# Patient Record
Sex: FEMALE | URBAN - METROPOLITAN AREA
[De-identification: names, ages, dates, MRNs, and addresses within clinical notes are randomized per-mention and may not be internally consistent; named-entity substitution may affect disease eponyms.]

---

## 2020-07-06 ENCOUNTER — OFFICE VISIT (OUTPATIENT)
Dept: INTERNAL MEDICINE CLINIC | Age: 70
End: 2020-07-06

## 2020-07-06 VITALS
HEIGHT: 62 IN | OXYGEN SATURATION: 98 % | TEMPERATURE: 98.6 F | BODY MASS INDEX: 18.22 KG/M2 | DIASTOLIC BLOOD PRESSURE: 70 MMHG | WEIGHT: 99 LBS | SYSTOLIC BLOOD PRESSURE: 138 MMHG | RESPIRATION RATE: 16 BRPM | HEART RATE: 72 BPM

## 2020-07-06 DIAGNOSIS — R63.4 WEIGHT LOSS: ICD-10-CM

## 2020-07-06 DIAGNOSIS — R45.7 CAREGIVER STRESS SYNDROME: Primary | ICD-10-CM

## 2020-07-06 DIAGNOSIS — F41.8 INSOMNIA SECONDARY TO DEPRESSION WITH ANXIETY: ICD-10-CM

## 2020-07-06 DIAGNOSIS — F32.A MILD DEPRESSION: ICD-10-CM

## 2020-07-06 DIAGNOSIS — R53.82 CHRONIC FATIGUE: ICD-10-CM

## 2020-07-06 DIAGNOSIS — M05.731 RHEUMATOID ARTHRITIS INVOLVING BOTH WRISTS WITH POSITIVE RHEUMATOID FACTOR (HCC): ICD-10-CM

## 2020-07-06 DIAGNOSIS — F51.05 INSOMNIA SECONDARY TO DEPRESSION WITH ANXIETY: ICD-10-CM

## 2020-07-06 DIAGNOSIS — M05.732 RHEUMATOID ARTHRITIS INVOLVING BOTH WRISTS WITH POSITIVE RHEUMATOID FACTOR (HCC): ICD-10-CM

## 2020-07-06 LAB
A-G RATIO,AGRAT: 1.6 RATIO
ALBUMIN SERPL-MCNC: 4.5 G/DL (ref 3.9–5.4)
ALP SERPL-CCNC: 119 U/L (ref 38–126)
ALT SERPL-CCNC: 14 U/L (ref 0–35)
ANION GAP SERPL CALC-SCNC: 5 MMOL/L
AST SERPL W P-5'-P-CCNC: 19 U/L (ref 14–36)
BILIRUB SERPL-MCNC: 0.6 MG/DL (ref 0.2–1.3)
BUN SERPL-MCNC: 17 MG/DL (ref 7–17)
BUN/CREATININE RATIO,BUCR: 24 RATIO
CALCIUM SERPL-MCNC: 10 MG/DL (ref 8.4–10.2)
CHLORIDE SERPL-SCNC: 104 MMOL/L (ref 98–107)
CO2 SERPL-SCNC: 29 MMOL/L (ref 22–32)
CREAT SERPL-MCNC: 0.7 MG/DL (ref 0.7–1.2)
ERYTHROCYTE [DISTWIDTH] IN BLOOD BY AUTOMATED COUNT: 13.2 %
GLOBULIN,GLOB: 2.8
GLUCOSE SERPL-MCNC: 95 MG/DL (ref 65–105)
HCT VFR BLD AUTO: 42.5 % (ref 37–51)
HGB BLD-MCNC: 13.7 G/DL (ref 12–18)
LYMPHOCYTES ABSOLUTE: 1.4 K/UL (ref 0.6–4.1)
LYMPHOCYTES NFR BLD: 22.6 % (ref 10–58.5)
MCH RBC QN AUTO: 31.8 PG (ref 26–32)
MCHC RBC AUTO-ENTMCNC: 32.2 G/DL (ref 30–36)
MCV RBC AUTO: 98.5 FL (ref 80–97)
MONOCYTES ABS-DIF,2141: 0.4 K/UL (ref 0–1.8)
MONOCYTES NFR BLD: 6.5 % (ref 0.1–24)
NEUTROPHILS # BLD: 70.9 % (ref 37–92)
NEUTROPHILS ABS,2156: 4.5 K/UL (ref 2–7.8)
PLATELET # BLD AUTO: 310 K/UL (ref 140–440)
POTASSIUM SERPL-SCNC: 4.7 MMOL/L (ref 3.6–5)
PROT SERPL-MCNC: 7.3 G/DL (ref 6.3–8.2)
RBC # BLD AUTO: 4.31 M/UL (ref 4.2–6.3)
SODIUM SERPL-SCNC: 138 MMOL/L (ref 137–145)
TSH SERPL DL<=0.05 MIU/L-ACNC: 0.88 UIU/ML (ref 0.34–5.6)
WBC # BLD AUTO: 6.3 K/UL (ref 4.1–10.9)

## 2020-07-06 RX ORDER — HYDROXYZINE PAMOATE 25 MG/1
25 CAPSULE ORAL
Qty: 30 CAP | Refills: 0 | Status: SHIPPED | OUTPATIENT
Start: 2020-07-06 | End: 2020-08-05

## 2020-07-06 RX ORDER — TEMAZEPAM 7.5 MG/1
7.5 CAPSULE ORAL
Qty: 30 CAP | Refills: 0 | Status: SHIPPED | OUTPATIENT
Start: 2020-07-06 | End: 2020-07-06

## 2020-07-06 RX ORDER — ETODOLAC 400 MG/1
TABLET, FILM COATED ORAL
COMMUNITY
Start: 2020-05-04

## 2020-07-06 RX ORDER — METHOTREXATE 2.5 MG/1
2.5 TABLET ORAL
COMMUNITY

## 2020-07-06 RX ORDER — HYDROXYZINE PAMOATE 25 MG/1
25 CAPSULE ORAL
Qty: 30 CAP | Refills: 0 | Status: SHIPPED | OUTPATIENT
Start: 2020-07-06 | End: 2020-07-06

## 2020-07-06 RX ORDER — UREA 10 %
800 LOTION (ML) TOPICAL DAILY
COMMUNITY

## 2020-07-06 RX ORDER — TEMAZEPAM 7.5 MG/1
7.5 CAPSULE ORAL
Qty: 30 CAP | Refills: 0 | Status: SHIPPED | OUTPATIENT
Start: 2020-07-06 | End: 2020-07-27

## 2020-07-06 RX ORDER — ESCITALOPRAM OXALATE 5 MG/1
5 TABLET ORAL DAILY
Qty: 30 TAB | Refills: 0 | Status: SHIPPED | OUTPATIENT
Start: 2020-07-06 | End: 2020-07-06

## 2020-07-06 RX ORDER — ESCITALOPRAM OXALATE 5 MG/1
5 TABLET ORAL DAILY
Qty: 30 TAB | Refills: 0 | Status: SHIPPED | OUTPATIENT
Start: 2020-07-06 | End: 2020-08-05

## 2020-07-06 NOTE — PROGRESS NOTES
Shellie Severance is a 72 y.o. female presenting for Establish Care  . 1. Have you been to the ER, urgent care clinic since your last visit? Hospitalized since your last visit? No    2. Have you seen or consulted any other health care providers outside of the [address] [place] [address] since your last visit? Include any pap smears or colon screening. No    Fall Risk Assessment, last 12 mths 7/6/2020   Able to walk? Yes   Fall in past 12 months? No         Abuse Screening Questionnaire 7/6/2020   Do you ever feel afraid of your partner? N   Are you in a relationship with someone who physically or mentally threatens you? N   Is it safe for you to go home? Y       3 most recent PHQ Screens 7/6/2020   Little interest or pleasure in doing things More than half the days   Feeling down, depressed, irritable, or hopeless More than half the days   Total Score PHQ 2 4   Trouble falling or staying asleep, or sleeping too much Several days   Feeling tired or having little energy More than half the days   Poor appetite, weight loss, or overeating Not at all   Feeling bad about yourself - or that you are a failure or have let yourself or your family down Not at all   Trouble concentrating on things such as school, work, reading, or watching TV Several days   Moving or speaking so slowly that other people could have noticed; or the opposite being so fidgety that others notice Several days   Thoughts of being better off dead, or hurting yourself in some way Not at all   PHQ 9 Score 9   How difficult have these problems made it for you to do your work, take care of your home and get along with others Somewhat difficult       There are no discontinued medications.

## 2020-07-06 NOTE — PATIENT INSTRUCTIONS

## 2020-07-06 NOTE — PROGRESS NOTES
This note will not be viewable in [address] Ugo Thomson is a 72 y.o. female and presents with Establish Care      Subjective:  Ysabel Krause is a new patient to our practice. She is here to establish care. Her  sees Dr. Perlita Ventura. She has been following up with Dr. Barry Casey rheumatologist as her PCP. Patient reports she has been doing fairly well however she has. Symptoms of mild anxiety and depression and caregiver stress syndrome as her  was recently diagnosed with a diagnosis of lung cancer and has been following up with Dr. Lina Rueda in at [facility] for radiation oncology. She has lost 10 pounds of weight and does not have much of an appetite. She is very anxious and stressed and says insomnia secondary to anxiety and depression. She has been trying to meditate to help her with the stress however that is not helping much yet. She has not followed up with any counselor or psychiatrist.    She has a history of rheumatoid arthritis for which she is on methotrexate. Her symptoms are quiescent. She follows up with Dr. Yemi White. History of carpal tunnel syndrome s/p left wrist flexor tenosynovectomy and left carpal tunnel release surgery by Dr. Deborah Monge. On 1/18/2019. Past Medical History:   Diagnosis Date    RA (rheumatoid arthritis) ([facility]     Maikol Harmon  7/1/15 note/lab rec'd     Past Surgical History:   Procedure Laterality Date    HX ORTHOPAEDIC  02/2019    left    HX ORTHOPAEDIC      left nuckle replacement     Allergies   Allergen Reactions    Sulfa (Sulfonamide Antibiotics) Rash     Current Outpatient Medications   Medication Sig Dispense Refill    methotrexate (RHEUMATREX) 2.5 mg tablet Take 2.5 mg by mouth every Monday.  etodolac (LODINE) 400 mg tablet       folic acid 134 mcg tablet Take 800 mcg by mouth daily. 2 tabs daily      escitalopram oxalate (LEXAPRO) 5 mg tablet Take 1 Tab by mouth daily for 30 days.  30 Tab 0    temazepam (RESTORIL) 7.5 mg capsule Take 1 Cap by mouth nightly as needed for Sleep or Anxiety for up to 30 days. Max Daily Amount: 7.5 mg. 30 Cap 0    hydrOXYzine pamoate (VISTARIL) 25 mg capsule Take 1 Cap by mouth daily as needed for Anxiety or Agitation for up to 30 days. 27 Cap 0     Social History     Socioeconomic History    Marital status:      Spouse name: Not on file    Number of children: Not on file    Years of education: Not on file    Highest education level: Not on file   Tobacco Use    Smoking status: Never Smoker    Smokeless tobacco: Never Used   Substance and Sexual Activity    Alcohol use: Not Currently    Drug use: Never    Sexual activity: Not Currently     Partners: Male     Family History   Problem Relation Age of Onset    No Known Problems Mother     Heart Disease Father        Review of Systems  ROS is unremarkable except for ones highlighted in bold.    Constitutional: negative for fevers, chills, anorexia and weight loss  Eyes:   negative for visual disturbance and irritation  ENT:   negative for tinnitus,sore throat,nasal congestion,ear pain,hoarseness  Respiratory:  negative for cough, hemoptysis, dyspnea,wheezing  CV:   negative for chest pain, palpitations, lower extremity edema  GI:   negative for nausea, vomiting, diarrhea, abdominal pain,melena  Endo:               negative for polyuria,polydipsia,polyphagia,heat intolerance  Genitourinary: negative for frequency, dysuria and hematuria  Integumentary: negative for rash and pruritus  Hematologic:  negative for easy bruising and gum/nose bleeding  Musculoskel: negative for myalgias, arthralgias, back pain, muscle weakness, joint pain  Neurological:  negative for headaches, dizziness, vertigo, memory problems and gait   Behavl/Psych: negative for feelings of anxiety, depression, mood changes  ROS otherwise negative     Objective:  Visit Vitals  /70 (BP 1 Location: Left arm, BP Patient Position: Sitting)   Pulse 72   Temp 98.6 °F (37 °C) (Oral) Resp 16   Ht 5' 2\" (1.575 m)   Wt 99 lb (44.9 kg)   SpO2 98%   BMI 18.11 kg/m²     Physical Exam:   General appearance - alert, well appearing, and in no distress  Mental status - alert, oriented to person, place, and time  EYE-MIK, EOMI, fundi normal, corneas normal, no foreign bodies  ENT-ENT exam normal, no neck nodes or sinus tenderness  Nose - normal and patent, no erythema, discharge or polyps  Mouth - mucous membranes moist, pharynx normal without lesions  Neck - supple, no significant adenopathy   Chest - clear to auscultation, no wheezes, rales or rhonchi, symmetric air entry   Heart - normal rate, regular rhythm, normal S1, S2, no murmurs, rubs, clicks or gallops   Abdomen - soft, nontender, nondistended, no masses or organomegaly  Lymph- no adenopathy palpable  Ext-peripheral pulses normal, no pedal edema, no clubbing or cyanosis  Skin-Warm and dry. no hyperpigmentation, vitiligo, or suspicious lesions  Neuro -alert, oriented, normal speech, no focal findings or movement disorder noted  Musculoskeletal- FROM, no bony abnormalities, no point tenderness, rheumatoid arthritis deformity seen in bilateral hands, metacarpophalangeal joint, PIP, DIP joints inflamed. [place] nodules and [place] nodules demonstrated. Lab Review:  Results for orders placed or performed in visit on 07/06/20   TSH 3RD GENERATION   Result Value Ref Range    TSH, 3rd generation 0.88 0.34 - 4.88 uIU/mL   METABOLIC PANEL, COMPREHENSIVE   Result Value Ref Range    Glucose 95 65 - 105 mg/dL    BUN 17.0 7.0 - 17.0 mg/dL    Creatinine 0.7 0.7 - 1.2 mg/dL    Sodium 138 137 - 145 mmol/L    Potassium 4.7 3.6 - 5.0 mmol/L    Chloride 104 98 - 107 mmol/L    CO2 29.0 22.0 - 32.0 mmol/L    Calcium 10.0 8.4 - 10.2 mg/dl    Protein, total 7.3 6.3 - 8.2 g/dL    Albumin 4.5 3.9 - 5.4 g/dL    ALT (SGPT) 14 0 - 35 U/L    AST (SGOT) 19.0 14.0 - 36.0 U/L    Alk.  phosphatase 119 38 - 126 U/L    Bilirubin, total 0.6 0.2 - 1.3 mg/dL BUN/Creatinine ratio 24 Ratio    GFR est AA >60 mL/min/1.73m2    GFR est non-AA >60 mL/min/1.73m2    Globulin 2.80     A-G Ratio 1.6 Ratio    Anion gap 5 mmol/L   CBC WITH AUTOMATED DIFF   Result Value Ref Range    WBC 6.3 4.1 - 10.9 K/uL    RBC 4.31 4.20 - 6.30 M/uL    HGB 13.7 12.0 - 18.0 g/dL    HCT 42.5 37.0 - 51.0 %    MCH 31.8 26.0 - 32.0 pg    MCHC 32.2 30.0 - 36.0 g/dL    MCV 98.5 (H) 80.0 - 97.0 fL    RDW 13.2 %    PLATELET 269.9 540.4 - 440.0 K/uL    Neutrophils abs 4.5 2.0 - 7.8 K/uL    Neutrophils 70.9 37.0 - 92.0 %    Monocytes abs-DIF 0.4 0.0 - 1.8 K/uL    MONOCYTES 6.5 0.1 - 24.0 %    Lymphocytes Absolute 1.4 0.6 - 4.1 K/uL    LYMPHOCYTES 22.6 10.0 - 58.5 %        Documenation Review:    Assessment/Plan:    Diagnoses and all orders for this visit:    1. Caregiver stress syndrome    2. Insomnia secondary to depression with anxiety  -     METABOLIC PANEL, COMPREHENSIVE  -     CBC WITH AUTOMATED DIFF  -     temazepam (RESTORIL) 7.5 mg capsule; Take 1 Cap by mouth nightly as needed for Sleep or Anxiety for up to 30 days. Max Daily Amount: 7.5 mg.  -     hydrOXYzine pamoate (VISTARIL) 25 mg capsule; Take 1 Cap by mouth daily as needed for Anxiety or Agitation for up to 30 days. 3. Weight loss  -     METABOLIC PANEL, COMPREHENSIVE  -     CBC WITH AUTOMATED DIFF    4. Chronic fatigue  -     TSH 3RD GENERATION  -     METABOLIC PANEL, COMPREHENSIVE  -     CBC WITH AUTOMATED DIFF    5. Mild depression (HCC)  -     escitalopram oxalate (LEXAPRO) 5 mg tablet; Take 1 Tab by mouth daily for 30 days. 6. Rheumatoid arthritis involving both wrists with positive rheumatoid factor (HCC)      Will start patient on Lexapro 5 mg and escalate the dose as needed, after evaluation in 30 days. Use Atarax as needed for anxiety. Restoril as needed for insomnia. She will follow-up with me in 30 days to see if we need to uptitrate the dose.     Continue current meds  Recent records from Dr. Raina Marcus office   I will call with lab results and make further recommendations or adjustments if necessary. ICD-10-CM ICD-9-CM    1. Caregiver stress syndrome R45.7 308.9    2. Insomnia secondary to depression with anxiety N10.84 779.5 METABOLIC PANEL, COMPREHENSIVE    F41.8 327.02 CBC WITH AUTOMATED DIFF      temazepam (RESTORIL) 7.5 mg capsule      hydrOXYzine pamoate (VISTARIL) 25 mg capsule      DISCONTINUED: temazepam (RESTORIL) 7.5 mg capsule      DISCONTINUED: hydrOXYzine pamoate (VISTARIL) 25 mg capsule   3. Weight loss J51.3 343.54 METABOLIC PANEL, COMPREHENSIVE      CBC WITH AUTOMATED DIFF   4. Chronic fatigue R53.82 780.79 TSH 3RD GENERATION      METABOLIC PANEL, COMPREHENSIVE      CBC WITH AUTOMATED DIFF   5. Mild depression (HCC) F32.0 311 escitalopram oxalate (LEXAPRO) 5 mg tablet      DISCONTINUED: escitalopram oxalate (LEXAPRO) 5 mg tablet   6. Rheumatoid arthritis involving both wrists with positive rheumatoid factor (HCC) M05.731 714.0     M05.732           Follow-up and Dispositions    · Return in about 3 months (around 10/6/2020) for follow up. I have reviewed with the patient details of the assessment and plan and all questions were answered. Relevent patient education was performed. Verbal and/or written instructions (see AVS) provided. The most recent lab findings were reviewed with the patient. Plan was discussed with patient who verbally expressed understanding. An After Visit Summary was printed and given to the patient.     Marilyn Burgos MD

## 2020-07-27 ENCOUNTER — OFFICE VISIT (OUTPATIENT)
Dept: INTERNAL MEDICINE CLINIC | Age: 70
End: 2020-07-27

## 2020-07-27 VITALS
RESPIRATION RATE: 14 BRPM | OXYGEN SATURATION: 98 % | WEIGHT: 102 LBS | HEIGHT: 62 IN | HEART RATE: 76 BPM | TEMPERATURE: 98.8 F | DIASTOLIC BLOOD PRESSURE: 62 MMHG | BODY MASS INDEX: 18.77 KG/M2 | SYSTOLIC BLOOD PRESSURE: 126 MMHG

## 2020-07-27 DIAGNOSIS — F41.8 INSOMNIA SECONDARY TO DEPRESSION WITH ANXIETY: ICD-10-CM

## 2020-07-27 DIAGNOSIS — N39.0 URINARY TRACT INFECTION WITHOUT HEMATURIA, SITE UNSPECIFIED: ICD-10-CM

## 2020-07-27 DIAGNOSIS — R53.82 CHRONIC FATIGUE: ICD-10-CM

## 2020-07-27 DIAGNOSIS — F51.05 INSOMNIA SECONDARY TO DEPRESSION WITH ANXIETY: ICD-10-CM

## 2020-07-27 DIAGNOSIS — N30.00 ACUTE CYSTITIS WITHOUT HEMATURIA: Primary | ICD-10-CM

## 2020-07-27 DIAGNOSIS — F32.A MILD DEPRESSION: ICD-10-CM

## 2020-07-27 LAB
BACTERIA,BACTU: ABNORMAL
BILIRUB UR QL: ABNORMAL
CLARITY: CLEAR
COLOR UR: ABNORMAL
GLUCOSE 24H UR-MRATE: NEGATIVE G/(24.H)
HGB UR QL STRIP: ABNORMAL
KETONES UR QL STRIP.AUTO: NEGATIVE
LEUKOCYTE ESTERASE: ABNORMAL
NITRITE UR QL STRIP.AUTO: NEGATIVE
PH UR STRIP: 5 [PH] (ref 5–7)
PROT UR STRIP-MCNC: ABNORMAL MG/DL
RBC #/AREA URNS HPF: ABNORMAL #/HPF
SP GR UR REFRACTOMETRY: 1.02 (ref 1–1.03)
UROBILINOGEN UR QL STRIP.AUTO: NEGATIVE
WBC URNS QL MICRO: ABNORMAL #/HPF

## 2020-07-27 RX ORDER — CEPHALEXIN 500 MG/1
500 CAPSULE ORAL 2 TIMES DAILY
Qty: 14 CAP | Refills: 0 | Status: SHIPPED | OUTPATIENT
Start: 2020-07-27 | End: 2020-08-03

## 2020-07-27 NOTE — PATIENT INSTRUCTIONS
Urinary Tract Infection in Women: Care Instructions Your Care Instructions A urinary tract infection, or UTI, is a general term for an infection anywhere between the kidneys and the urethra (where urine comes out). Most UTIs are bladder infections. They often cause pain or burning when you urinate. UTIs are caused by bacteria and can be cured with antibiotics. Be sure to complete your treatment so that the infection goes away. Follow-up care is a key part of your treatment and safety. Be sure to make and go to all appointments, and call your doctor if you are having problems. It's also a good idea to know your test results and keep a list of the medicines you take. How can you care for yourself at home? · Take your antibiotics as directed. Do not stop taking them just because you feel better. You need to take the full course of antibiotics. · Drink extra water and other fluids for the next day or two. This may help wash out the bacteria that are causing the infection. (If you have kidney, heart, or liver disease and have to limit fluids, talk with your doctor before you increase your fluid intake.) · Avoid drinks that are carbonated or have caffeine. They can irritate the bladder. · Urinate often. Try to empty your bladder each time. · To relieve pain, take a hot bath or lay a heating pad set on low over your lower belly or genital area. Never go to sleep with a heating pad in place. To prevent UTIs · Drink plenty of water each day. This helps you urinate often, which clears bacteria from your system. (If you have kidney, heart, or liver disease and have to limit fluids, talk with your doctor before you increase your fluid intake.) · Urinate when you need to. · Urinate right after you have sex. · Change sanitary pads often. · Avoid douches, bubble baths, feminine hygiene sprays, and other feminine hygiene products that have deodorants. · After going to the bathroom, wipe from front to back. When should you call for help? Call your doctor now or seek immediate medical care if: · Symptoms such as fever, chills, nausea, or vomiting get worse or appear for the first time. · You have new pain in your back just below your rib cage. This is called flank pain. · There is new blood or pus in your urine. · You have any problems with your antibiotic medicine. Watch closely for changes in your health, and be sure to contact your doctor if: 
· You are not getting better after taking an antibiotic for 2 days. · Your symptoms go away but then come back. Where can you learn more? Go to http://www.gray.com/ Enter U982 in the search box to learn more about \"Urinary Tract Infection in Women: Care Instructions. \" Current as of: August 22, 2019               Content Version: 12.5 © 8768-1727 [employer]. Care instructions adapted under license by [employer] (which disclaims liability or warranty for this information). If you have questions about a medical condition or this instruction, always ask your healthcare professional. [address] any warranty or liability for your use of this information.

## 2020-07-27 NOTE — PROGRESS NOTES
This note will not be viewable in [address] Nahomy Rodriguez is a 72 y.o. female and presents with Bladder Infection      Subjective:  Patient comes in today for acute care visit. Since last week she is noticed increased frequency, urination and burning micturition. She has had a low-grade fever but denies chills. Does have on and off fatigue. She has not tried anything for her symptoms. Her  is undergoing cancer treatments and she was very depressed in the last OB visit and has been losing weight. Started on Lexapro 5 mg and she is noticed a change in her mentation. She is also getting to spend more time with her  and her moods are much better now. She reports the 5 mg has been doing good for her and she is also gained 3 pounds and her appetite is slightly better. Riddhi Carlisle is a new patient to our practice. She is here to establish care. Her  sees Dr. Candice Varma. She has been following up with Dr. Jovany Jamil rheumatologist as her PCP. Patient reports she has been doing fairly well however she has. Symptoms of mild anxiety and depression and caregiver stress syndrome as her  was recently diagnosed with a diagnosis of lung cancer and has been following up with Dr. Donnie Beck in at [facility] for radiation oncology. She has lost 10 pounds of weight and does not have much of an appetite. She is very anxious and stressed and says insomnia secondary to anxiety and depression. She has been trying to meditate to help her with the stress however that is not helping much yet. She has not followed up with any counselor or psychiatrist.    She has a history of rheumatoid arthritis for which she is on methotrexate. Her symptoms are quiescent. She follows up with Dr. Etelvina Gray. History of carpal tunnel syndrome s/p left wrist flexor tenosynovectomy and left carpal tunnel release surgery by Dr. Rhina Gosselin. On 1/18/2019.     Past Medical History:   Diagnosis Date    [facility]     Elfego Mynor  7/1/15 note/lab rec'd     Past Surgical History:   Procedure Laterality Date    HX ORTHOPAEDIC  02/2019    left    HX ORTHOPAEDIC      left nuckle replacement     Allergies   Allergen Reactions    Sulfa (Sulfonamide Antibiotics) Rash     Current Outpatient Medications   Medication Sig Dispense Refill    cephALEXin (KEFLEX) 500 mg capsule Take 1 Cap by mouth two (2) times a day for 7 days. 14 Cap 0    methotrexate (RHEUMATREX) 2.5 mg tablet Take 2.5 mg by mouth every Monday.  etodolac (LODINE) 400 mg tablet       folic acid 479 mcg tablet Take 800 mcg by mouth daily. 2 tabs daily      escitalopram oxalate (LEXAPRO) 5 mg tablet Take 1 Tab by mouth daily for 30 days. 30 Tab 0    hydrOXYzine pamoate (VISTARIL) 25 mg capsule Take 1 Cap by mouth daily as needed for Anxiety or Agitation for up to 30 days. 27 Cap 0     Social History     Socioeconomic History    Marital status:      Spouse name: Not on file    Number of children: Not on file    Years of education: Not on file    Highest education level: Not on file   Tobacco Use    Smoking status: Never Smoker    Smokeless tobacco: Never Used   Substance and Sexual Activity    Alcohol use: Not Currently    Drug use: Never    Sexual activity: Not Currently     Partners: Male     Family History   Problem Relation Age of Onset    No Known Problems Mother     Heart Disease Father        Review of Systems  ROS is unremarkable except for ones highlighted in bold.    Constitutional: negative for fevers, chills, anorexia and weight loss  Eyes:   negative for visual disturbance and irritation  ENT:   negative for tinnitus,sore throat,nasal congestion,ear pain,hoarseness  Respiratory:  negative for cough, hemoptysis, dyspnea,wheezing  CV:   negative for chest pain, palpitations, lower extremity edema  GI:   negative for nausea, vomiting, diarrhea, abdominal pain,melena  Endo:               negative for polyuria,polydipsia,polyphagia,heat intolerance  Genitourinary: negative for frequency, dysuria and hematuria  Integumentary: negative for rash and pruritus  Hematologic:  negative for easy bruising and gum/nose bleeding  Musculoskel: negative for myalgias, arthralgias, back pain, muscle weakness, joint pain  Neurological:  negative for headaches, dizziness, vertigo, memory problems and gait   Behavl/Psych: negative for feelings of anxiety, depression, mood changes  ROS otherwise negative     Objective:  Visit Vitals  /62 (BP 1 Location: Left arm, BP Patient Position: Sitting)   Pulse 76   Temp 98.8 °F (37.1 °C)   Resp 14   Ht 5' 2\" (1.575 m)   Wt 102 lb (46.3 kg)   SpO2 98%   BMI 18.66 kg/m²     Physical Exam:   General appearance - alert, well appearing, and in no distress  Mental status - alert, oriented to person, place, and time  EYE-MIK, EOMI, fundi normal, corneas normal, no foreign bodies  ENT-ENT exam normal, no neck nodes or sinus tenderness  Nose - normal and patent, no erythema, discharge or polyps  Mouth - mucous membranes moist, pharynx normal without lesions  Neck - supple, no significant adenopathy   Chest - clear to auscultation, no wheezes, rales or rhonchi, symmetric air entry   Heart - normal rate, regular rhythm, normal S1, S2, no murmurs, rubs, clicks or gallops   Abdomen - soft, nontender, nondistended, no masses or organomegaly  Lymph- no adenopathy palpable  Ext-peripheral pulses normal, no pedal edema, no clubbing or cyanosis  Skin-Warm and dry. no hyperpigmentation, vitiligo, or suspicious lesions  Neuro -alert, oriented, normal speech, no focal findings or movement disorder noted  Musculoskeletal- FROM, no bony abnormalities, no point tenderness, rheumatoid arthritis deformity seen in bilateral hands, metacarpophalangeal joint, PIP, DIP joints inflamed. [place] nodules and [place] nodules demonstrated.     Lab Review:  Results for orders placed or performed in visit on 07/06/20 TSH 3RD GENERATION   Result Value Ref Range    TSH, 3rd generation 0.88 0.34 - 1.00 uIU/mL   METABOLIC PANEL, COMPREHENSIVE   Result Value Ref Range    Glucose 95 65 - 105 mg/dL    BUN 17.0 7.0 - 17.0 mg/dL    Creatinine 0.7 0.7 - 1.2 mg/dL    Sodium 138 137 - 145 mmol/L    Potassium 4.7 3.6 - 5.0 mmol/L    Chloride 104 98 - 107 mmol/L    CO2 29.0 22.0 - 32.0 mmol/L    Calcium 10.0 8.4 - 10.2 mg/dl    Protein, total 7.3 6.3 - 8.2 g/dL    Albumin 4.5 3.9 - 5.4 g/dL    ALT (SGPT) 14 0 - 35 U/L    AST (SGOT) 19.0 14.0 - 36.0 U/L    Alk. phosphatase 119 38 - 126 U/L    Bilirubin, total 0.6 0.2 - 1.3 mg/dL    BUN/Creatinine ratio 24 Ratio    GFR est AA >60 mL/min/1.73m2    GFR est non-AA >60 mL/min/1.73m2    Globulin 2.80     A-G Ratio 1.6 Ratio    Anion gap 5 mmol/L   CBC WITH AUTOMATED DIFF   Result Value Ref Range    WBC 6.3 4.1 - 10.9 K/uL    RBC 4.31 4.20 - 6.30 M/uL    HGB 13.7 12.0 - 18.0 g/dL    HCT 42.5 37.0 - 51.0 %    MCH 31.8 26.0 - 32.0 pg    MCHC 32.2 30.0 - 36.0 g/dL    MCV 98.5 (H) 80.0 - 97.0 fL    RDW 13.2 %    PLATELET 663.5 196.0 - 440.0 K/uL    Neutrophils abs 4.5 2.0 - 7.8 K/uL    Neutrophils 70.9 37.0 - 92.0 %    Monocytes abs-DIF 0.4 0.0 - 1.8 K/uL    MONOCYTES 6.5 0.1 - 24.0 %    Lymphocytes Absolute 1.4 0.6 - 4.1 K/uL    LYMPHOCYTES 22.6 10.0 - 58.5 %        Documenation Review:    Assessment/Plan:    Diagnoses and all orders for this visit:    1. Acute cystitis without hematuria  -     URINALYSIS W/MICROSCOPIC  -     cephALEXin (KEFLEX) 500 mg capsule; Take 1 Cap by mouth two (2) times a day for 7 days. 2. Insomnia secondary to depression with anxiety    3. Chronic fatigue    4. Mild depression ([address]        Check UA  We will send in prescription of Keflex to her preferred pharmacy. Continue current meds  Recent records from Dr. Lilly Christian office   I will call with lab results and make further recommendations or adjustments if necessary. Continue Lexapro 5 mg.   You may up titrate the dose to 10 mg in a month and see if that works better for you. ICD-10-CM ICD-9-CM    1. Acute cystitis without hematuria  N30.00 595.0 URINALYSIS W/MICROSCOPIC      cephALEXin (KEFLEX) 500 mg capsule   2. Insomnia secondary to depression with anxiety  F51.05 300.4     F41.8 327.02    3. Chronic fatigue  R53.82 780.79    4. Mild depression ([facility]  F32.0 311              I have reviewed with the patient details of the assessment and plan and all questions were answered. Relevent patient education was performed. Verbal and/or written instructions (see AVS) provided. The most recent lab findings were reviewed with the patient. Plan was discussed with patient who verbally expressed understanding. An After Visit Summary was printed and given to the patient.     Nancy Felipe MD

## 2020-07-27 NOTE — PROGRESS NOTES
Evy Ariza is a 72 y.o. female presenting for Bladder Infection  . 1. Have you been to the ER, urgent care clinic since your last visit? Hospitalized since your last visit? No    2. Have you seen or consulted any other health care providers outside of the [address] [place] [address] since your last visit? Include any pap smears or colon screening. No    Fall Risk Assessment, last 12 mths 7/6/2020   Able to walk? Yes   Fall in past 12 months? No         Abuse Screening Questionnaire 7/6/2020   Do you ever feel afraid of your partner? N   Are you in a relationship with someone who physically or mentally threatens you? N   Is it safe for you to go home? Y       3 most recent PHQ Screens 7/6/2020   Little interest or pleasure in doing things More than half the days   Feeling down, depressed, irritable, or hopeless More than half the days   Total Score PHQ 2 4   Trouble falling or staying asleep, or sleeping too much Several days   Feeling tired or having little energy More than half the days   Poor appetite, weight loss, or overeating Not at all   Feeling bad about yourself - or that you are a failure or have let yourself or your family down Not at all   Trouble concentrating on things such as school, work, reading, or watching TV Several days   Moving or speaking so slowly that other people could have noticed; or the opposite being so fidgety that others notice Several days   Thoughts of being better off dead, or hurting yourself in some way Not at all   PHQ 9 Score 9   How difficult have these problems made it for you to do your work, take care of your home and get along with others Somewhat difficult       There are no discontinued medications.

## 2020-07-29 LAB — BACTERIA UR CULT: NORMAL

## 2020-08-26 ENCOUNTER — TELEPHONE (OUTPATIENT)
Dept: INTERNAL MEDICINE CLINIC | Age: 70
End: 2020-08-26

## 2020-08-26 DIAGNOSIS — R21 RASH OF HANDS: Primary | ICD-10-CM

## 2020-08-26 RX ORDER — HYDROCORTISONE 25 MG/G
CREAM TOPICAL 2 TIMES DAILY
Qty: 30 G | Refills: 0 | Status: SHIPPED | OUTPATIENT
Start: 2020-08-26

## 2020-08-26 NOTE — TELEPHONE ENCOUNTER
Brian Ortiz MD  You 1 hour ago (1:15 PM)       I have sent in a prescription for hydrocortisone cream to be applied on her rash.  We will see her tomorrow in follow-up.     Message        Patient notified

## 2020-08-26 NOTE — TELEPHONE ENCOUNTER
Patient called complaining of a rash on her hands. I scheduled her for tomorrow at 2:45pm. She would like the ointment sent to [facility] pharmacy at [facility].   CB: [phone]

## 2020-08-27 ENCOUNTER — OFFICE VISIT (OUTPATIENT)
Dept: INTERNAL MEDICINE CLINIC | Age: 70
End: 2020-08-27
Payer: MEDICARE

## 2020-08-27 VITALS
TEMPERATURE: 96.7 F | BODY MASS INDEX: 18.95 KG/M2 | DIASTOLIC BLOOD PRESSURE: 68 MMHG | WEIGHT: 103 LBS | RESPIRATION RATE: 14 BRPM | HEIGHT: 62 IN | OXYGEN SATURATION: 98 % | HEART RATE: 79 BPM | SYSTOLIC BLOOD PRESSURE: 132 MMHG

## 2020-08-27 DIAGNOSIS — R22.9 MULTIPLE SKIN NODULES: ICD-10-CM

## 2020-08-27 DIAGNOSIS — M05.731 RHEUMATOID ARTHRITIS INVOLVING BOTH WRISTS WITH POSITIVE RHEUMATOID FACTOR (HCC): ICD-10-CM

## 2020-08-27 DIAGNOSIS — R21 RASH OF HANDS: Primary | ICD-10-CM

## 2020-08-27 DIAGNOSIS — M05.732 RHEUMATOID ARTHRITIS INVOLVING BOTH WRISTS WITH POSITIVE RHEUMATOID FACTOR (HCC): ICD-10-CM

## 2020-08-27 PROCEDURE — 1090F PRES/ABSN URINE INCON ASSESS: CPT | Performed by: INTERNAL MEDICINE

## 2020-08-27 PROCEDURE — 1101F PT FALLS ASSESS-DOCD LE1/YR: CPT | Performed by: INTERNAL MEDICINE

## 2020-08-27 PROCEDURE — G9717 DOC PT DX DEP/BP F/U NT REQ: HCPCS | Performed by: INTERNAL MEDICINE

## 2020-08-27 PROCEDURE — 3017F COLORECTAL CA SCREEN DOC REV: CPT | Performed by: INTERNAL MEDICINE

## 2020-08-27 PROCEDURE — 99213 OFFICE O/P EST LOW 20 MIN: CPT | Performed by: INTERNAL MEDICINE

## 2020-08-27 PROCEDURE — G8400 PT W/DXA NO RESULTS DOC: HCPCS | Performed by: INTERNAL MEDICINE

## 2020-08-27 PROCEDURE — G8420 CALC BMI NORM PARAMETERS: HCPCS | Performed by: INTERNAL MEDICINE

## 2020-08-27 PROCEDURE — G8427 DOCREV CUR MEDS BY ELIG CLIN: HCPCS | Performed by: INTERNAL MEDICINE

## 2020-08-27 PROCEDURE — G8536 NO DOC ELDER MAL SCRN: HCPCS | Performed by: INTERNAL MEDICINE

## 2020-08-27 NOTE — PROGRESS NOTES
Cristo Ward is a 72 y.o. female presenting for Rash (both hands)  . 1. Have you been to the ER, urgent care clinic since your last visit? Hospitalized since your last visit? No    2. Have you seen or consulted any other health care providers outside of the [address] [place] [address] since your last visit? Include any pap smears or colon screening. No    Fall Risk Assessment, last 12 mths 7/6/2020   Able to walk? Yes   Fall in past 12 months? No         Abuse Screening Questionnaire 7/6/2020   Do you ever feel afraid of your partner? N   Are you in a relationship with someone who physically or mentally threatens you? N   Is it safe for you to go home? Y       3 most recent PHQ Screens 7/6/2020   Little interest or pleasure in doing things More than half the days   Feeling down, depressed, irritable, or hopeless More than half the days   Total Score PHQ 2 4   Trouble falling or staying asleep, or sleeping too much Several days   Feeling tired or having little energy More than half the days   Poor appetite, weight loss, or overeating Not at all   Feeling bad about yourself - or that you are a failure or have let yourself or your family down Not at all   Trouble concentrating on things such as school, work, reading, or watching TV Several days   Moving or speaking so slowly that other people could have noticed; or the opposite being so fidgety that others notice Several days   Thoughts of being better off dead, or hurting yourself in some way Not at all   PHQ 9 Score 9   How difficult have these problems made it for you to do your work, take care of your home and get along with others Somewhat difficult       There are no discontinued medications.

## 2020-08-27 NOTE — PROGRESS NOTES
This note will not be viewable in [address] Yessy Mullen is a 72 y.o. female and presents with Rash (both hands)      Subjective:  Comes to acute care visit concerned about a rash and nodules on her bilateral hands. Patient is noted multiple skin nodules on bilateral hands near the PIP and DIP joints. She reports at first they were little painful and red. Not today. She is not recently been started on any medication or over-the-counter medication. She has severe rheumatoid arthritis and follows up with Dr. Eric Dudley. Remains on methotrexate. Her  is undergoing cancer treatments and she was very depressed in the last OB visit and has been losing weight. Started on Lexapro 5 mg and she is noticed a change in her mentation. She is also getting to spend more time with her  and her moods are much better now. She reports the 5 mg has been doing good for her and she is also gained 3 pounds and her appetite is slightly better. Matias Birmingham is a new patient to our practice. She is here to establish care. Her  sees Dr. Marleen Liu. She has been following up with Dr. Corinne Alonzo rheumatologist as her PCP. Patient reports she has been doing fairly well however she has. Symptoms of mild anxiety and depression and caregiver stress syndrome as her  was recently diagnosed with a diagnosis of lung cancer and has been following up with Dr. Margret Padilla in at [facility] for radiation oncology. She has lost 10 pounds of weight and does not have much of an appetite. She is very anxious and stressed and says insomnia secondary to anxiety and depression. She has been trying to meditate to help her with the stress however that is not helping much yet. She has not followed up with any counselor or psychiatrist.    She has a history of rheumatoid arthritis for which she is on methotrexate. Her symptoms are quiescent. She follows up with Dr. Raina Marcus.     History of carpal tunnel syndrome s/p left wrist flexor tenosynovectomy and left carpal tunnel release surgery by Dr. Jaqui Butler. On 1/18/2019. Past Medical History:   Diagnosis Date    RA (rheumatoid arthritis) ([facility] [address]     Silvia Bennett  7/1/15 note/lab rec'd     Past Surgical History:   Procedure Laterality Date    HX ORTHOPAEDIC  02/2019    left    HX ORTHOPAEDIC      left nuckle replacement     Allergies   Allergen Reactions    Sulfa (Sulfonamide Antibiotics) Rash     Current Outpatient Medications   Medication Sig Dispense Refill    hydrocortisone (HYTONE) 2.5 % topical cream Apply  to affected area two (2) times a day. use thin layer 30 g 0    methotrexate (RHEUMATREX) 2.5 mg tablet Take 2.5 mg by mouth every Monday.  etodolac (LODINE) 400 mg tablet       folic acid 812 mcg tablet Take 800 mcg by mouth daily. 2 tabs daily       Social History     Socioeconomic History    Marital status:      Spouse name: Not on file    Number of children: Not on file    Years of education: Not on file    Highest education level: Not on file   Tobacco Use    Smoking status: Never Smoker    Smokeless tobacco: Never Used   Substance and Sexual Activity    Alcohol use: Not Currently    Drug use: Never    Sexual activity: Not Currently     Partners: Male     Family History   Problem Relation Age of Onset    No Known Problems Mother     Heart Disease Father        Review of Systems  ROS is unremarkable except for ones highlighted in bold.    Constitutional: negative for fevers, chills, anorexia and weight loss  Eyes:   negative for visual disturbance and irritation  ENT:   negative for tinnitus,sore throat,nasal congestion,ear pain,hoarseness  Respiratory:  negative for cough, hemoptysis, dyspnea,wheezing  CV:   negative for chest pain, palpitations, lower extremity edema  GI:   negative for nausea, vomiting, diarrhea, abdominal pain,melena  Endo:               negative for polyuria,polydipsia,polyphagia,heat intolerance  Genitourinary: negative for frequency, dysuria and hematuria  Integumentary: negative for rash and pruritus  Hematologic:  negative for easy bruising and gum/nose bleeding  Musculoskel: negative for myalgias, arthralgias, back pain, muscle weakness, joint pain  Neurological:  negative for headaches, dizziness, vertigo, memory problems and gait   Behavl/Psych: negative for feelings of anxiety, depression, mood changes  ROS otherwise negative     Objective:  Visit Vitals  /68 (BP 1 Location: Left arm, BP Patient Position: Sitting)   Pulse 79   Temp (!) 96.7 °F (35.9 °C)   Resp 14   Ht 5' 2\" (1.575 m)   Wt 103 lb (46.7 kg)   SpO2 98%   BMI 18.84 kg/m²     Physical Exam:   General appearance - alert, well appearing, and in no distress  Mental status - alert, oriented to person, place, and time  EYE-MIK, EOMI, fundi normal, corneas normal, no foreign bodies  ENT-ENT exam normal, no neck nodes or sinus tenderness  Nose - normal and patent, no erythema, discharge or polyps  Mouth - mucous membranes moist, pharynx normal without lesions  Neck - supple, no significant adenopathy   Chest - clear to auscultation, no wheezes, rales or rhonchi, symmetric air entry   Heart - normal rate, regular rhythm, normal S1, S2, no murmurs, rubs, clicks or gallops   Abdomen - soft, nontender, nondistended, no masses or organomegaly  Lymph- no adenopathy palpable  Ext-peripheral pulses normal, no pedal edema, no clubbing or cyanosis  Skin-Warm and dry. no hyperpigmentation, vitiligo, or suspicious lesions, nodules noticed near PIP, DIP bilateral hands  Neuro -alert, oriented, normal speech, no focal findings or movement disorder noted  Musculoskeletal- FROM, no bony abnormalities, no point tenderness, rheumatoid arthritis deformity seen in bilateral hands, metacarpophalangeal joint, PIP, DIP joints inflamed. [place] nodules and [place] nodules demonstrated.     Lab Review:  Results for orders placed or performed in visit on 07/27/20   CULTURE, URINE    Specimen: Urine   Result Value Ref Range    Urine Culture, Routine       Mixed urogenital koko  Less than 10,000 colonies/mL     URINALYSIS W/MICROSCOPIC   Result Value Ref Range    Color Pale Yellow Pale Yellow - Yellow    CLARITY clear Clear    Glucose urine, 24 hr Negative Negative    Ketone Negative Negative    Bilirubin 2+ (A) Negative    Specific gravity 1.020 1.000 - 1.030    pH (UA) 5 5 - 7    Blood 1+ (A) Negative    Protein 1+ (A) Negative    Urobilinogen Negative Negative    Nitrites Negative Negative    Leukocyte Esterase 1+ (A) Negative    WBC 3-5 (A) 0 #/HPF    RBC 2-5 (A) 0 #/HPF    Bacteria Few (A) None Seen        Documenation Review:    Assessment/Plan:    Diagnoses and all orders for this visit:    1. Rash of hands  -     REFERRAL TO DERMATOLOGY    2. Rheumatoid arthritis involving both wrists with positive rheumatoid factor (HCC)    3. Multiple skin nodules  -     REFERRAL TO DERMATOLOGY      Referral for dermatology placed. Recommend taking Benadryl as needed and hydrocortisone ointment on bilateral hands. Continue current meds   records from Dr. Lyla Daugherty office   I will call with lab results and make further recommendations or adjustments if necessary. Continue Lexapro 5 mg. You may up titrate the dose to 10 mg in a month and see if that works better for you. ICD-10-CM ICD-9-CM    1. Rash of hands  R21 782.1 REFERRAL TO DERMATOLOGY   2. Rheumatoid arthritis involving both wrists with positive rheumatoid factor (HCC)  M05.731 714.0     M05.732     3. Multiple skin nodules  R22.9 782.2 REFERRAL TO DERMATOLOGY             I have reviewed with the patient details of the assessment and plan and all questions were answered. Relevent patient education was performed. Verbal and/or written instructions (see AVS) provided. The most recent lab findings were reviewed with the patient.   Plan was discussed with patient who verbally expressed understanding. An After Visit Summary was printed and given to the patient.     Colletta Merlin, MD

## 2020-10-26 ENCOUNTER — VIRTUAL VISIT (OUTPATIENT)
Dept: INTERNAL MEDICINE CLINIC | Age: 70
End: 2020-10-26
Payer: MEDICARE

## 2020-10-26 DIAGNOSIS — M05.731 RHEUMATOID ARTHRITIS INVOLVING BOTH WRISTS WITH POSITIVE RHEUMATOID FACTOR (HCC): ICD-10-CM

## 2020-10-26 DIAGNOSIS — M05.732 RHEUMATOID ARTHRITIS INVOLVING BOTH WRISTS WITH POSITIVE RHEUMATOID FACTOR (HCC): ICD-10-CM

## 2020-10-26 DIAGNOSIS — B07.9 WART ON THUMB: Primary | ICD-10-CM

## 2020-10-26 DIAGNOSIS — R45.7 CAREGIVER STRESS SYNDROME: ICD-10-CM

## 2020-10-26 PROCEDURE — 99442 PR PHYS/QHP TELEPHONE EVALUATION 11-20 MIN: CPT | Performed by: INTERNAL MEDICINE

## 2020-10-26 RX ORDER — BISMUTH SUBSALICYLATE 262 MG
1 TABLET,CHEWABLE ORAL DAILY
COMMUNITY

## 2020-10-26 NOTE — PROGRESS NOTES
Juan Roger is a 72 y.o. female and presents with Arthritis (3 mo fu) Juan Roger was seen by synchronous (real-time) audio-video technology on 10/26/20. Consent: 
She and/or her healthcare decision maker is aware that this patient-initiated Telehealth encounter is a billable service, with coverage as determined by her insurance carrier. She is aware that she may receive a bill and has provided verbal consent to proceed: Yes I was in the office while conducting this encounter. Pursuant to the emergency declaration under the [facility] waiver authority and the [email or web address] and [employer] Act, this Virtual  Visit was conducted, with patient's consent, to reduce the patient's risk of exposure to COVID-19 and provide continuity of care for an established patient. Services were provided through a video synchronous discussion virtually to substitute for in-person clinic visit. Subjective: 
 
 
Comes to acute care visit concerned about a rash and nodules on her bilateral hands. Patient is noted multiple skin nodules on bilateral hands near the PIP and DIP joints. She reports at first they were little painful and red. Not today. She is not recently been started on any medication or over-the-counter medication. She has severe rheumatoid arthritis and follows up with Dr. Ana Olivier. Remains on methotrexate. Her  is undergoing cancer treatments and she was very depressed in the last OB visit and has been losing weight. Started on Lexapro 5 mg and she is noticed a change in her mentation. She is also getting to spend more time with her  and her moods are much better now. She reports the 5 mg has been doing good for her and she is also gained 3 pounds and her appetite is slightly better. Gianna Castrejon is a new patient to our practice.   She is here to establish care.  Her  sees Dr. Milo Freeman. She has been following up with Dr. Georgie Enamorado rheumatologist as her PCP. Patient reports she has been doing fairly well however she has. Symptoms of mild anxiety and depression and caregiver stress syndrome as her  was recently diagnosed with a diagnosis of lung cancer and has been following up with Dr. Kevin Dixon in at [facility] for radiation oncology. She has lost 10 pounds of weight and does not have much of an appetite. She is very anxious and stressed and says insomnia secondary to anxiety and depression. She has been trying to meditate to help her with the stress however that is not helping much yet. She has not followed up with any counselor or psychiatrist. 
 
She has a history of rheumatoid arthritis for which she is on methotrexate. Her symptoms are quiescent. She follows up with Dr. Micki Zamora. History of carpal tunnel syndrome s/p left wrist flexor tenosynovectomy and left carpal tunnel release surgery by Dr. Fredy De Santiago. On 1/18/2019. Past Medical History:  
Diagnosis Date  RA (rheumatoid arthritis) ([facility] [address] Kaela Moore  7/1/15 note/lab rec'd Past Surgical History:  
Procedure Laterality Date  HX ORTHOPAEDIC  02/2019  
 left  HX ORTHOPAEDIC    
 left nuckle replacement Allergies Allergen Reactions  Sulfa (Sulfonamide Antibiotics) Rash Current Outpatient Medications Medication Sig Dispense Refill  multivitamin (ONE A DAY) tablet Take 1 Tab by mouth daily.  hydrocortisone (HYTONE) 2.5 % topical cream Apply  to affected area two (2) times a day. use thin layer 30 g 0  
 methotrexate (RHEUMATREX) 2.5 mg tablet Take 2.5 mg by mouth every Monday.  etodolac (LODINE) 400 mg tablet  folic acid 476 mcg tablet Take 800 mcg by mouth daily. 2 tabs daily Social History Socioeconomic History  Marital status:  Spouse name: Not on file  Number of children: Not on file  Years of education: Not on file  Highest education level: Not on file Tobacco Use  Smoking status: Never Smoker  Smokeless tobacco: Never Used Substance and Sexual Activity  Alcohol use: Not Currently  Drug use: Never  Sexual activity: Not Currently Partners: Male Family History Problem Relation Age of Onset  No Known Problems Mother  Heart Disease Father Review of Systems ROS is unremarkable except for ones highlighted in bold. Constitutional: negative for fevers, chills, anorexia and weight loss Eyes:   negative for visual disturbance and irritation ENT:   negative for tinnitus,sore throat,nasal congestion,ear pain,hoarseness Respiratory:  negative for cough, hemoptysis, dyspnea,wheezing CV:   negative for chest pain, palpitations, lower extremity edema GI:   negative for nausea, vomiting, diarrhea, abdominal pain,melena Endo:               negative for polyuria,polydipsia,polyphagia,heat intolerance Genitourinary: negative for frequency, dysuria and hematuria Integumentary: negative for rash and pruritus Hematologic:  negative for easy bruising and gum/nose bleeding Musculoskel: negative for myalgias, arthralgias, back pain, muscle weakness, joint pain Neurological:  negative for headaches, dizziness, vertigo, memory problems and gait Behavl/Psych: negative for feelings of anxiety, depression, mood changes ROS otherwise negative Objective: There were no vitals taken for this visit. Physical Exam:  
 
Objective:  
Vital Signs: (As obtained by patient/caregiver at home) There were no vitals taken for this visit. [INSTRUCTIONS:  \"[x]\" Indicates a positive item  \"[]\" Indicates a negative item  -- DELETE ALL ITEMS NOT EXAMINED] Constitutional: [x] Appears well-developed and well-nourished [x] No apparent distress   
  [] Abnormal - Mental status: [x] Alert and awake  [x] Oriented to person/place/time [x] Able to follow commands [] Abnormal - Eyes:   EOM    [x]  Normal    [] Abnormal -  
Sclera  [x]  Normal    [] Abnormal - 
        Discharge [x]  None visible   [] Abnormal - HENT: [x] Normocephalic, atraumatic  [] Abnormal -  
[x] Mouth/Throat: Mucous membranes are moist 
 
External Ears [x] Normal  [] Abnormal - Neck: [x] No visualized mass [] Abnormal - Pulmonary/Chest: [x] Respiratory effort normal   [x] No visualized signs of difficulty breathing or respiratory distress 
      [] Abnormal - Musculoskeletal:   [x] Normal gait with no signs of ataxia [x] Normal range of motion of neck [] Abnormal -  
 
Neurological:        [x] No Facial Asymmetry (Cranial nerve 7 motor function) (limited exam due to video visit) [x] No gaze palsy  
     [] Abnormal -   
     
Skin:        [x] No significant exanthematous lesions or discoloration noted on facial skin   
     [] Abnormal - Psychiatric:       [x] Normal Affect [] Abnormal -  
     [x] No Hallucinations Other pertinent observable physical exam findings:- 
 
 
 
We discussed the expected course, resolution and complications of the diagnosis(es) in detail. Medication risks, benefits, costs, interactions, and alternatives were discussed as indicated. I advised her to contact the office if her condition worsens, changes or fails to improve as anticipated. She expressed understanding with the diagnosis(es) and plan. Lab Review: 
Results for orders placed or performed in visit on 07/27/20 CULTURE, URINE Specimen: Urine Result Value Ref Range Urine Culture, Routine Mixed urogenital koko Less than 10,000 colonies/mL URINALYSIS W/MICROSCOPIC Result Value Ref Range Color Pale Yellow Pale Yellow - Yellow CLARITY clear Clear Glucose urine, 24 hr Negative Negative Ketone Negative Negative Bilirubin 2+ (A) Negative Specific gravity 1.020 1.000 - 1.030  
 pH (UA) 5 5 - 7 Blood 1+ (A) Negative Protein 1+ (A) Negative Urobilinogen Negative Negative Nitrites Negative Negative Leukocyte Esterase 1+ (A) Negative WBC 3-5 (A) 0 #/HPF  
 RBC 2-5 (A) 0 #/HPF Bacteria Few (A) None Seen Documenation Review: 
 
Assessment/Plan: 
 
{There are no diagnoses linked to this encounter. (Refresh or delete this SmartLink)} Referral for dermatology placed. Recommend taking Benadryl as needed and hydrocortisone ointment on bilateral hands. Continue current meds 
 records from Dr. Charles [facility] office I will call with lab results and make further recommendations or adjustments if necessary. Continue Lexapro 5 mg. You may up titrate the dose to 10 mg in a month and see if that works better for you. No diagnosis found. I have reviewed with the patient details of the assessment and plan and all questions were answered. Relevent patient education was performed. Verbal and/or written instructions (see AVS) provided. The most recent lab findings were reviewed with the patient. Plan was discussed with patient who verbally expressed understanding. An After Visit Summary was printed and given to the patient.  
 
Amanda Jara MD

## 2020-10-26 NOTE — PROGRESS NOTES
Jennie Salcedo is a 72 y.o. female presenting for Arthritis (3 mo fu)  . 1. Have you been to the ER, urgent care clinic since your last visit? Hospitalized since your last visit? No    2. Have you seen or consulted any other health care providers outside of the [address] [place] [address] since your last visit? Include any pap smears or colon screening. No    Fall Risk Assessment, last 12 mths 7/6/2020   Able to walk? Yes   Fall in past 12 months? No         Abuse Screening Questionnaire 7/6/2020   Do you ever feel afraid of your partner? N   Are you in a relationship with someone who physically or mentally threatens you? N   Is it safe for you to go home? Y       3 most recent PHQ Screens 7/6/2020   Little interest or pleasure in doing things More than half the days   Feeling down, depressed, irritable, or hopeless More than half the days   Total Score PHQ 2 4   Trouble falling or staying asleep, or sleeping too much Several days   Feeling tired or having little energy More than half the days   Poor appetite, weight loss, or overeating Not at all   Feeling bad about yourself - or that you are a failure or have let yourself or your family down Not at all   Trouble concentrating on things such as school, work, reading, or watching TV Several days   Moving or speaking so slowly that other people could have noticed; or the opposite being so fidgety that others notice Several days   Thoughts of being better off dead, or hurting yourself in some way Not at all   PHQ 9 Score 9   How difficult have these problems made it for you to do your work, take care of your home and get along with others Somewhat difficult       There are no discontinued medications.

## 2020-10-26 NOTE — PROGRESS NOTES
Toño Tavarez is a 72 y.o. female, evaluated via audio-only technology on 10/26/2020 for Arthritis (3 mo fu)  . Assessment & Plan:   Diagnoses and all orders for this visit:    1. Wart on thumb    2. Rheumatoid arthritis involving both wrists with positive rheumatoid factor (HCC)    3. Caregiver stress syndrome        12  Subjective:   Patient was started on Lexapro during her last office visit and I recommended to uptitrate the dose. She took it for few days however self weaned herself off the medication since she reported her symptoms were better and she thought she longer wanted to stay on antidepressant. Reports her sleep and appetite is much improved and she is also cleaned few pounds. She had rash and nodules on her bilateral index finger and and followed up with dermatologist who diagnosed warts. Prior to Admission medications    Medication Sig Start Date End Date Taking? Authorizing Provider   multivitamin (ONE A DAY) tablet Take 1 Tab by mouth daily. Yes Provider, Historical   hydrocortisone (HYTONE) 2.5 % topical cream Apply  to affected area two (2) times a day. use thin layer 8/26/20  Yes Marija Camacho MD   methotrexate (RHEUMATREX) 2.5 mg tablet Take 2.5 mg by mouth every Monday. Yes Provider, Historical   etodolac (LODINE) 400 mg tablet  5/4/20  Yes Provider, Historical   folic acid 465 mcg tablet Take 800 mcg by mouth daily.  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  hydrocortisone (HYTONE) 2.5 % topical cream Apply  to affected area two (2) times a day. use thin layer 30 g 0    methotrexate (RHEUMATREX) 2.5 mg tablet Take 2.5 mg by mouth every Monday.  etodolac (LODINE) 400 mg tablet       folic acid 824 mcg tablet Take 800 mcg by mouth daily. 2 tabs daily       Allergies   Allergen Reactions    Sulfa (Sulfonamide Antibiotics) Rash     Past Medical History:   Diagnosis Date    RA (rheumatoid arthritis) ([address]     Donna Boas  7/1/15 note/lab rec'd     Past Surgical History:   Procedure Laterality Date    HX ORTHOPAEDIC  02/2019    left    HX ORTHOPAEDIC      left nuckle replacement     Family History   Problem Relation Age of Onset    No Known Problems Mother     Heart Disease Father      Social History     Tobacco Use    Smoking status: Never Smoker    Smokeless tobacco: Never Used   Substance Use Topics    Alcohol use: Not Currently       ROS    Patient-Reported Vitals 10/26/2020   Patient-Reported Weight 104        Tiffany Rucker, who was evaluated through a patient-initiated, synchronous (real-time) audio only encounter, and/or her healthcare decision maker, is aware that it is a billable service, with coverage as determined by her insurance carrier. She provided verbal consent to proceed: Yes. She has not had a related appointment within my department in the past 7 days or scheduled within the next 24 hours.       Total Time: minutes: 11-20 minutes    Leonarda Brady MD

## 2022-03-18 PROBLEM — F32.A MILD DEPRESSION: Status: ACTIVE | Noted: 2020-07-06

## 2022-03-18 PROBLEM — M05.732 RHEUMATOID ARTHRITIS INVOLVING BOTH WRISTS WITH POSITIVE RHEUMATOID FACTOR (HCC): Status: ACTIVE | Noted: 2020-07-06

## 2022-03-18 PROBLEM — M05.731 RHEUMATOID ARTHRITIS INVOLVING BOTH WRISTS WITH POSITIVE RHEUMATOID FACTOR (HCC): Status: ACTIVE | Noted: 2020-07-06

## 2022-03-19 PROBLEM — F51.05 INSOMNIA SECONDARY TO DEPRESSION WITH ANXIETY: Status: ACTIVE | Noted: 2020-07-06

## 2022-03-19 PROBLEM — F41.8 INSOMNIA SECONDARY TO DEPRESSION WITH ANXIETY: Status: ACTIVE | Noted: 2020-07-06

## 2023-05-16 RX ORDER — UREA 10 %
LOTION (ML) TOPICAL DAILY
COMMUNITY

## 2023-05-16 RX ORDER — ETODOLAC 400 MG/1
TABLET, FILM COATED ORAL
COMMUNITY
Start: 2020-05-04